# Patient Record
Sex: MALE | Race: ASIAN | NOT HISPANIC OR LATINO
[De-identification: names, ages, dates, MRNs, and addresses within clinical notes are randomized per-mention and may not be internally consistent; named-entity substitution may affect disease eponyms.]

---

## 2020-11-25 ENCOUNTER — APPOINTMENT (OUTPATIENT)
Dept: CARDIOLOGY | Facility: CLINIC | Age: 65
End: 2020-11-25
Payer: MEDICARE

## 2020-11-25 ENCOUNTER — NON-APPOINTMENT (OUTPATIENT)
Age: 65
End: 2020-11-25

## 2020-11-25 VITALS
OXYGEN SATURATION: 94 % | WEIGHT: 138 LBS | HEART RATE: 83 BPM | HEIGHT: 65 IN | TEMPERATURE: 98.1 F | RESPIRATION RATE: 16 BRPM | SYSTOLIC BLOOD PRESSURE: 154 MMHG | BODY MASS INDEX: 22.99 KG/M2 | DIASTOLIC BLOOD PRESSURE: 93 MMHG

## 2020-11-25 DIAGNOSIS — R00.2 PALPITATIONS: ICD-10-CM

## 2020-11-25 PROCEDURE — 93306 TTE W/DOPPLER COMPLETE: CPT

## 2020-11-25 PROCEDURE — 99203 OFFICE O/P NEW LOW 30 MIN: CPT | Mod: 25

## 2020-11-25 PROCEDURE — 93015 CV STRESS TEST SUPVJ I&R: CPT

## 2020-11-25 PROCEDURE — 93000 ELECTROCARDIOGRAM COMPLETE: CPT | Mod: 59

## 2020-12-03 NOTE — REASON FOR VISIT
[FreeTextEntry1] : 65 year-old male with hx of Hep B  presents for cardiac evaluations. Patient reports that his wife had a hemorrhagic stroke recently which prompted him to come for check up. Patient denies CP. Patient denies SOB. He reports occasional palpitations lasting less than 1 minute. Patient denies h/o syncope. I advised patient to undergo an echocardiogram and a treadmill stress test. Patient will return for test.

## 2020-12-03 NOTE — PHYSICAL EXAM
[General Appearance - Well Developed] : well developed [Normal Appearance] : normal appearance [Well Groomed] : well groomed [General Appearance - Well Nourished] : well nourished [No Deformities] : no deformities [General Appearance - In No Acute Distress] : no acute distress [Normal Conjunctiva] : the conjunctiva exhibited no abnormalities [Eyelids - No Xanthelasma] : the eyelids demonstrated no xanthelasmas [Normal Oral Mucosa] : normal oral mucosa [No Oral Pallor] : no oral pallor [No Oral Cyanosis] : no oral cyanosis [Normal Jugular Venous A Waves Present] : normal jugular venous A waves present [Normal Jugular Venous V Waves Present] : normal jugular venous V waves present [No Jugular Venous Kennedy A Waves] : no jugular venous kennedy A waves [Heart Rate And Rhythm] : heart rate and rhythm were normal [Heart Sounds] : normal S1 and S2 [Murmurs] : no murmurs present [Respiration, Rhythm And Depth] : normal respiratory rhythm and effort [Exaggerated Use Of Accessory Muscles For Inspiration] : no accessory muscle use [Auscultation Breath Sounds / Voice Sounds] : lungs were clear to auscultation bilaterally [Abdomen Soft] : soft [Abdomen Tenderness] : non-tender [Abdomen Mass (___ Cm)] : no abdominal mass palpated [Abnormal Walk] : normal gait [Gait - Sufficient For Exercise Testing] : the gait was sufficient for exercise testing [Nail Clubbing] : no clubbing of the fingernails [Cyanosis, Localized] : no localized cyanosis [Petechial Hemorrhages (___cm)] : no petechial hemorrhages [] : no ischemic changes [Oriented To Time, Place, And Person] : oriented to person, place, and time [Affect] : the affect was normal [Mood] : the mood was normal [No Anxiety] : not feeling anxious

## 2021-09-28 ENCOUNTER — APPOINTMENT (OUTPATIENT)
Dept: CARDIOLOGY | Facility: CLINIC | Age: 66
End: 2021-09-28
Payer: MEDICARE

## 2021-09-28 VITALS
TEMPERATURE: 97.6 F | BODY MASS INDEX: 23.63 KG/M2 | WEIGHT: 142 LBS | OXYGEN SATURATION: 94 % | SYSTOLIC BLOOD PRESSURE: 147 MMHG | DIASTOLIC BLOOD PRESSURE: 86 MMHG | HEART RATE: 69 BPM | RESPIRATION RATE: 16 BRPM

## 2021-09-28 PROCEDURE — 99214 OFFICE O/P EST MOD 30 MIN: CPT

## 2021-10-05 NOTE — HISTORY OF PRESENT ILLNESS
[FreeTextEntry1] : 66 year-old male with hx of Hep B  presents for followup.  \par \par Patient was last seen on 11/25/20 for cardiac evaluation.  Patient underwent an echocardiogram and it showed normal LV function without significant valvular pathology.  Patient underwent a treadmill stress test and completed 9 minutes of Daquan protocol.  There was no ECG evidence of ischemia. Following treadmill stress, there was no echocardiographic evidence of ischemia.  Patient was noted to have mild aortic root dilatation (4.2 cm).\par

## 2021-10-05 NOTE — REASON FOR VISIT
[Symptom and Test Evaluation] : symptom and test evaluation [FreeTextEntry1] : 9/28/21 - Patient reports feeling well. Patient denies CP, SOB, palpitations. PCP sent patient to followup on aortic aneurysm.. His ECG did not show any issues. Patient reports that hearing swooshing noise on the L ear at night. I advised patient to get Carotid Doppler. Patient reports occasional mild dizziness. Patient reports BP is normal at PCPs and at home. He is only on liver medications. Patient is also concerned about CAD. I advised patient to return in 2 months for echocardiogram and stress test. \par \par \par \par 11/25/20 - Patient reports that his wife had a hemorrhagic stroke recently which prompted him to come for check up.  Patient denies CP.  Patient denies SOB.  He reports occasional palpitations lasting less than 1 minute.  Patient denies h/o syncope. I  advised patient to undergo an echocardiogram and a treadmill stress test. Patient will return for test.

## 2021-11-26 PROBLEM — I71.9 AORTIC ANEURYSM: Status: ACTIVE | Noted: 2021-09-30

## 2021-11-26 PROBLEM — R07.89 CHEST DISCOMFORT: Status: ACTIVE | Noted: 2020-11-25

## 2021-11-26 NOTE — REASON FOR VISIT
[Symptom and Test Evaluation] : symptom and test evaluation [FreeTextEntry1] : 66 year-old male with hx of Hep B  presents for followup.  \par \par Patient was last seen on 11/25/20 for cardiac evaluation.  Patient underwent an echocardiogram and it showed normal LV function without significant valvular pathology.  Patient underwent a treadmill stress test and completed 9 minutes of Daquan protocol.  There was no ECG evidence of ischemia. Following treadmill stress, there was no echocardiographic evidence of ischemia.  Patient was noted to have mild aortic root dilatation (4.2 cm).\par   \par

## 2021-11-26 NOTE — HISTORY OF PRESENT ILLNESS
[FreeTextEntry1] : 9/28/21 - Patient reports feeling well.  Patient denies CP, SOB, palpitations.  PCP sent patient to followup on aortic aneurysm.. His ECG did not show any issues. Patient reports that hearing swooshing noise on the L ear at night.  I advised patient to get Carotid Doppler.  Patient reports occasional mild dizziness.  Patient reports BP is normal at PCPs and at home. He is only on liver medications. Patient is also concerned about CAD.  I advised patient to return in 2 months for echocardiogram and stress test. \par \par 11/25/20 - Patient reports that his wife had a hemorrhagic stroke recently which prompted him to come for check up.  Patient denies CP.  Patient denies SOB.  He reports occasional palpitations lasting less than 1 minute.  Patient denies h/o syncope. I  advised patient to undergo an echocardiogram and a treadmill stress test. Patient will return for test.

## 2021-11-29 ENCOUNTER — APPOINTMENT (OUTPATIENT)
Dept: CARDIOLOGY | Facility: CLINIC | Age: 66
End: 2021-11-29
Payer: MEDICARE

## 2021-11-29 VITALS — DIASTOLIC BLOOD PRESSURE: 79 MMHG | TEMPERATURE: 97.8 F | SYSTOLIC BLOOD PRESSURE: 127 MMHG

## 2021-11-29 DIAGNOSIS — R07.89 OTHER CHEST PAIN: ICD-10-CM

## 2021-11-29 DIAGNOSIS — I71.9 AORTIC ANEURYSM OF UNSPECIFIED SITE, W/OUT RUPTURE: ICD-10-CM

## 2021-11-29 PROCEDURE — 93306 TTE W/DOPPLER COMPLETE: CPT

## 2021-11-29 PROCEDURE — 93015 CV STRESS TEST SUPVJ I&R: CPT

## 2025-04-15 ENCOUNTER — APPOINTMENT (OUTPATIENT)
Dept: CARDIOLOGY | Facility: CLINIC | Age: 70
End: 2025-04-15
Payer: MEDICARE

## 2025-04-15 ENCOUNTER — APPOINTMENT (OUTPATIENT)
Dept: CARDIOLOGY | Facility: CLINIC | Age: 70
End: 2025-04-15

## 2025-04-15 VITALS
WEIGHT: 147 LBS | DIASTOLIC BLOOD PRESSURE: 98 MMHG | HEART RATE: 99 BPM | SYSTOLIC BLOOD PRESSURE: 157 MMHG | BODY MASS INDEX: 24.46 KG/M2 | OXYGEN SATURATION: 93 % | RESPIRATION RATE: 18 BRPM

## 2025-04-15 DIAGNOSIS — I71.9 AORTIC ANEURYSM OF UNSPECIFIED SITE, W/OUT RUPTURE: ICD-10-CM

## 2025-04-15 PROCEDURE — 93306 TTE W/DOPPLER COMPLETE: CPT

## 2025-04-15 PROCEDURE — 99204 OFFICE O/P NEW MOD 45 MIN: CPT
